# Patient Record
Sex: FEMALE | Race: ASIAN | Employment: STUDENT | URBAN - METROPOLITAN AREA
[De-identification: names, ages, dates, MRNs, and addresses within clinical notes are randomized per-mention and may not be internally consistent; named-entity substitution may affect disease eponyms.]

---

## 2017-05-03 PROCEDURE — 87340 HEPATITIS B SURFACE AG IA: CPT | Performed by: OBSTETRICS & GYNECOLOGY

## 2017-05-03 PROCEDURE — 88175 CYTOPATH C/V AUTO FLUID REDO: CPT | Performed by: OBSTETRICS & GYNECOLOGY

## 2017-05-03 PROCEDURE — 86762 RUBELLA ANTIBODY: CPT | Performed by: OBSTETRICS & GYNECOLOGY

## 2017-05-03 PROCEDURE — 86900 BLOOD TYPING SEROLOGIC ABO: CPT | Performed by: OBSTETRICS & GYNECOLOGY

## 2017-05-03 PROCEDURE — 87389 HIV-1 AG W/HIV-1&-2 AB AG IA: CPT | Performed by: OBSTETRICS & GYNECOLOGY

## 2017-05-03 PROCEDURE — 86901 BLOOD TYPING SEROLOGIC RH(D): CPT | Performed by: OBSTETRICS & GYNECOLOGY

## 2017-05-03 PROCEDURE — 87591 N.GONORRHOEAE DNA AMP PROB: CPT | Performed by: OBSTETRICS & GYNECOLOGY

## 2017-05-03 PROCEDURE — 86780 TREPONEMA PALLIDUM: CPT | Performed by: OBSTETRICS & GYNECOLOGY

## 2017-05-03 PROCEDURE — 86850 RBC ANTIBODY SCREEN: CPT | Performed by: OBSTETRICS & GYNECOLOGY

## 2017-05-03 PROCEDURE — 87491 CHLMYD TRACH DNA AMP PROBE: CPT | Performed by: OBSTETRICS & GYNECOLOGY

## 2021-09-20 ENCOUNTER — TELEPHONE (OUTPATIENT)
Dept: INTERNAL MEDICINE CLINIC | Facility: CLINIC | Age: 36
End: 2021-09-20

## 2021-09-20 DIAGNOSIS — J01.90 ACUTE RHINOSINUSITIS: Primary | ICD-10-CM

## 2021-09-20 RX ORDER — AMOXICILLIN AND CLAVULANATE POTASSIUM 875; 125 MG/1; MG/1
1 TABLET, FILM COATED ORAL 2 TIMES DAILY
Qty: 14 TABLET | Refills: 0 | Status: SHIPPED | OUTPATIENT
Start: 2021-09-20 | End: 2021-09-27

## 2021-09-20 NOTE — TELEPHONE ENCOUNTER
Pt called stating she has congestion, post nasal drip, a cough, ad some slight sinus pressure. Pt would like to know if she can get sent some antibiotics.    [id number] - [address], [address] ([address]

## 2021-09-20 NOTE — TELEPHONE ENCOUNTER
Patient reports s/s starting Thursday - both kids and spouse has sinus infection at this time. Green sputum/mucous  Wet cough  Sinus congestion    Patient denies any recent possible covid exposure.      Denies fever, shortness of breath, chest pain, wea

## 2021-09-20 NOTE — TELEPHONE ENCOUNTER
Can send in Augmentin, however I am getting a pregnancy warning when I try to prescribe it - can we confirm with patient? I believe this pregnancy status is just left over from her last medical record note from 2017 when she was pregnant.

## 2021-11-26 ENCOUNTER — LAB ENCOUNTER (OUTPATIENT)
Dept: LAB | Age: 36
End: 2021-11-26
Attending: INTERNAL MEDICINE
Payer: COMMERCIAL

## 2021-11-26 ENCOUNTER — HOSPITAL ENCOUNTER (OUTPATIENT)
Dept: GENERAL RADIOLOGY | Age: 36
Discharge: HOME | End: 2021-11-26
Attending: INTERNAL MEDICINE
Payer: COMMERCIAL

## 2021-11-26 ENCOUNTER — OFFICE VISIT (OUTPATIENT)
Dept: INTERNAL MEDICINE CLINIC | Facility: CLINIC | Age: 36
End: 2021-11-26
Payer: COMMERCIAL

## 2021-11-26 VITALS
BODY MASS INDEX: 21.16 KG/M2 | RESPIRATION RATE: 14 BRPM | WEIGHT: 127 LBS | TEMPERATURE: 98 F | SYSTOLIC BLOOD PRESSURE: 104 MMHG | DIASTOLIC BLOOD PRESSURE: 62 MMHG | HEART RATE: 59 BPM | OXYGEN SATURATION: 10 % | HEIGHT: 65 IN

## 2021-11-26 DIAGNOSIS — Z00.00 LABORATORY EXAMINATION ORDERED AS PART OF A ROUTINE GENERAL MEDICAL EXAMINATION: ICD-10-CM

## 2021-11-26 DIAGNOSIS — R63.4 WEIGHT LOSS: ICD-10-CM

## 2021-11-26 DIAGNOSIS — R63.4 WEIGHT LOSS: Primary | ICD-10-CM

## 2021-11-26 PROCEDURE — 85652 RBC SED RATE AUTOMATED: CPT | Performed by: INTERNAL MEDICINE

## 2021-11-26 PROCEDURE — 84439 ASSAY OF FREE THYROXINE: CPT | Performed by: INTERNAL MEDICINE

## 2021-11-26 PROCEDURE — 83036 HEMOGLOBIN GLYCOSYLATED A1C: CPT | Performed by: INTERNAL MEDICINE

## 2021-11-26 PROCEDURE — 86480 TB TEST CELL IMMUN MEASURE: CPT | Performed by: INTERNAL MEDICINE

## 2021-11-26 PROCEDURE — 80061 LIPID PANEL: CPT | Performed by: INTERNAL MEDICINE

## 2021-11-26 PROCEDURE — 81001 URINALYSIS AUTO W/SCOPE: CPT | Performed by: INTERNAL MEDICINE

## 2021-11-26 PROCEDURE — 3008F BODY MASS INDEX DOCD: CPT | Performed by: INTERNAL MEDICINE

## 2021-11-26 PROCEDURE — 3078F DIAST BP <80 MM HG: CPT | Performed by: INTERNAL MEDICINE

## 2021-11-26 PROCEDURE — 99204 OFFICE O/P NEW MOD 45 MIN: CPT | Performed by: INTERNAL MEDICINE

## 2021-11-26 PROCEDURE — 71046 X-RAY EXAM CHEST 2 VIEWS: CPT | Performed by: INTERNAL MEDICINE

## 2021-11-26 PROCEDURE — 3074F SYST BP LT 130 MM HG: CPT | Performed by: INTERNAL MEDICINE

## 2021-11-26 PROCEDURE — 80050 GENERAL HEALTH PANEL: CPT | Performed by: INTERNAL MEDICINE

## 2021-11-26 RX ORDER — NORETHINDRONE ACETATE AND ETHINYL ESTRADIOL .5; 2.5 MG/1; UG/1
1 TABLET ORAL DAILY
COMMUNITY

## 2021-11-26 NOTE — PROGRESS NOTES
Antoine VELÁZQUEZ 1985 is a 29year old female. Patient presents with: Follow - Up      HPI:     Current Outpatient Medications   Medication Sig Dispense Refill   • Norethindrone-Eth Estradiol 0.5-2.5 MG-MCG Oral Tab Take 1 tablet by mouth daily. stiffness none. Neurologic:   Patient denies dizziness, fainting, headache, loss of consciousness, memory loss, seizures, paresthesias, weakness. Tingling/numbness none. Trouble with balance none.    Psychiatric:   Patient denies anxiety, depression, Inso no adenopathy . Inguinal: no adenopathy. Supraclavicular: none. DERMATOLOGY:   Rash: no.       ASSESSMENT AND PLAN:   Abdifatah Elder was seen today for follow - up.     Diagnoses and all orders for this visit:    Weight loss  -     COMP METABOLIC PANEL (14);

## 2022-04-18 ENCOUNTER — OFFICE VISIT (OUTPATIENT)
Dept: INTERNAL MEDICINE CLINIC | Facility: CLINIC | Age: 37
End: 2022-04-18
Payer: COMMERCIAL

## 2022-04-18 VITALS
TEMPERATURE: 98 F | HEART RATE: 100 BPM | OXYGEN SATURATION: 98 % | HEIGHT: 65 IN | DIASTOLIC BLOOD PRESSURE: 76 MMHG | RESPIRATION RATE: 14 BRPM | SYSTOLIC BLOOD PRESSURE: 120 MMHG | BODY MASS INDEX: 20.66 KG/M2 | WEIGHT: 124 LBS

## 2022-04-18 DIAGNOSIS — R61 NIGHT SWEATS: Primary | ICD-10-CM

## 2022-04-18 PROCEDURE — 3008F BODY MASS INDEX DOCD: CPT | Performed by: INTERNAL MEDICINE

## 2022-04-18 PROCEDURE — 3074F SYST BP LT 130 MM HG: CPT | Performed by: INTERNAL MEDICINE

## 2022-04-18 PROCEDURE — 99213 OFFICE O/P EST LOW 20 MIN: CPT | Performed by: INTERNAL MEDICINE

## 2022-04-18 PROCEDURE — 3078F DIAST BP <80 MM HG: CPT | Performed by: INTERNAL MEDICINE

## 2022-04-18 RX ORDER — CLONAZEPAM 0.5 MG/1
TABLET ORAL
COMMUNITY
Start: 2022-04-17 | End: 2022-04-18

## 2022-04-18 RX ORDER — NORETHINDRONE ACETATE AND ETHINYL ESTRADIOL 1; 20 MG/1; UG/1
TABLET ORAL
COMMUNITY
Start: 2022-02-28

## 2022-04-18 RX ORDER — CYCLOBENZAPRINE HCL 10 MG
10 TABLET ORAL EVERY 8 HOURS PRN
COMMUNITY
Start: 2021-12-27 | End: 2022-04-18

## 2022-04-18 RX ORDER — SERTRALINE HYDROCHLORIDE 100 MG/1
200 TABLET, FILM COATED ORAL DAILY
COMMUNITY
Start: 2022-04-17

## 2022-04-18 NOTE — PATIENT INSTRUCTIONS
Lab work discussed with patient including chest x-ray. Basically told the patient I am at a loss as to why she is having these night sweats it could very well be hormonal could be some element of stress anxiety however should give her the benefit of doubt and get an opinion from infectious diseases.